# Patient Record
Sex: MALE | Race: WHITE | Employment: FULL TIME | URBAN - NONMETROPOLITAN AREA
[De-identification: names, ages, dates, MRNs, and addresses within clinical notes are randomized per-mention and may not be internally consistent; named-entity substitution may affect disease eponyms.]

---

## 2020-10-12 ENCOUNTER — HOSPITAL ENCOUNTER (OUTPATIENT)
Dept: GENERAL RADIOLOGY | Age: 55
Discharge: HOME | End: 2020-10-14
Payer: COMMERCIAL

## 2020-10-12 ENCOUNTER — HOSPITAL ENCOUNTER (OUTPATIENT)
Age: 55
Discharge: HOME | End: 2020-10-14
Payer: COMMERCIAL

## 2020-10-12 ENCOUNTER — OFFICE VISIT (OUTPATIENT)
Dept: CARDIOLOGY CLINIC | Age: 55
End: 2020-10-12
Payer: COMMERCIAL

## 2020-10-12 ENCOUNTER — HOSPITAL ENCOUNTER (OUTPATIENT)
Age: 55
Discharge: HOME | End: 2020-10-12
Payer: COMMERCIAL

## 2020-10-12 VITALS
DIASTOLIC BLOOD PRESSURE: 90 MMHG | HEART RATE: 46 BPM | OXYGEN SATURATION: 98 % | SYSTOLIC BLOOD PRESSURE: 140 MMHG | WEIGHT: 205.4 LBS

## 2020-10-12 PROCEDURE — 99204 OFFICE O/P NEW MOD 45 MIN: CPT | Performed by: INTERNAL MEDICINE

## 2020-10-12 PROCEDURE — 93005 ELECTROCARDIOGRAM TRACING: CPT

## 2020-10-12 PROCEDURE — 71046 X-RAY EXAM CHEST 2 VIEWS: CPT

## 2020-10-12 RX ORDER — LISINOPRIL 5 MG/1
5 TABLET ORAL 2 TIMES DAILY
Qty: 60 TABLET | Refills: 11 | Status: SHIPPED | OUTPATIENT
Start: 2020-10-12 | End: 2021-01-19

## 2020-10-12 RX ORDER — ASPIRIN 81 MG/1
162 TABLET ORAL DAILY
COMMUNITY
End: 2021-01-19

## 2020-10-12 RX ORDER — OMEPRAZOLE 20 MG/1
20 CAPSULE, DELAYED RELEASE ORAL
COMMUNITY

## 2020-10-12 RX ORDER — METOPROLOL TARTRATE 50 MG/1
25 TABLET, FILM COATED ORAL 2 TIMES DAILY
COMMUNITY
End: 2020-11-05

## 2020-10-12 NOTE — PROGRESS NOTES
Ov DR Jason Boggs  Ablation in 2017 at [address] one May 2019   Able to feel when in a fib.  20 years   3 children 1 son in [place]  2 daughters 1 going to   [employer]   Does not smoke   Does not drink. Tried drinking eat time  Went into a fib. Stopped heavy bicycling 4 years. Stopped eliquis on own. Father heart problems in 79  Pt has one brother one sister   No heart disease. Been on metoprolol since ablation   25 mg bid then 50 mg bid for 3 years. Not gone back to EP at [facility]. 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 Some sob walking up [place]   Bedside echo done. Snores but has never had  Sleep study. Will do echo at [facility] and FRIDA stress test here at [facility]. Encouraged to get Chao Chevy   And to restart eliquis. To decrease metoprolol to 25mg bid   A week prior to stress test.   Start lisinopril 5mg bid. Will give diltiazem 30mg prn. Will see in 3 mths no testing.

## 2020-10-12 NOTE — PROGRESS NOTES
Has always exercised. Used to be a cyclist and was in best shape of life. Elza Montes from [place] to [place]. A-flutter started 6 years ago, he had ablation. He had another ablation 4 years ago by Dr. Germaine Nair. Then 2 years ago he had another ablation at [facility]. In the last year has gone into A-fib. Can feel it happening. Wakes him up, gets SOB. 1 week ago he had episode that lasted for 12 hours before going away. Has family Hx. His mom and dad both had stenting. Used to take Eliquis but when was in sinus rhythm for a year he stopped it. He has doubled up on his Aspirin until came in for office for visit. 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

## 2020-10-12 NOTE — PATIENT INSTRUCTIONS
Decrease metoprolol 50mg to 25mg bid 1 week prior to stress test     Start lisinopril 5mg bid    Take diltiazem 30mg as needed for a fib.

## 2020-10-13 LAB
EKG ATRIAL RATE: 47 BPM
EKG P AXIS: 66 DEGREES
EKG P-R INTERVAL: 206 MS
EKG Q-T INTERVAL: 482 MS
EKG QRS DURATION: 104 MS
EKG QTC CALCULATION (BAZETT): 426 MS
EKG R AXIS: 75 DEGREES
EKG T AXIS: -160 DEGREES
EKG VENTRICULAR RATE: 47 BPM

## 2020-10-13 PROCEDURE — 93010 ELECTROCARDIOGRAM REPORT: CPT | Performed by: INTERNAL MEDICINE

## 2020-10-13 NOTE — PROGRESS NOTES
Starr Harrison M.D. [address] [place] [address]  [phone]        2020        Lori Fragoso DO  [address] [place] [address] [address]    RE:   Henny Nunez  :  1965    Dear Dr. Haroldo Jacobson:    CHIEF COMPLAINT:  Paroxysmal atrial fibrillation. HISTORY OF PRESENT ILLNESS:  I had the pleasure of seeing Mr. Miles Reyes in our office on 10/12/2020. 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 He has a history of paroxysmal atrial flutter. He was seen by Dr. Yeimi Carlin on 12/15/2016 for recurrent atrial flutter. At that time, he had an echocardiogram that showed apical LVH with overall normal LV function. He had had 4 cardioversions. Each time it happened, it was in atrial flutter. He had been seen by Dr. Luis Serrato, who is a cardiologist at [facility], prior to seeing Dr. Yeimi Carlin. His EKG at the time he saw Dr. Yeimi Carlin was markedly abnormal with chronically inverted T waves in V2 through V6 as well as inferior leads consistent with apical hypertrophy. He had a treadmill Myoview stress test on 2016 that was read as there was a small fixed defect in the basal, inferior septal, and inferior segments with mild hypokinesis of the mid inferior apical, anterior apical, and inferior segments. Ablation was done by Dr. Yeimi Carlin on 2017. He was continued on Eliquis. He was also placed on Lopressor 50 mg b.i.d. because of clinical reoccurrence of tachycardia and an event monitor showing atrial flutter as well as atrial fibrillation. His arrhythmias seemed to resolve several weeks after his ablation. He was seen again by Dr. Yeimi Carlin on 10/05/2017. He had missed a few doses of beta-blocker at that time and had more palpitations.   He did have an episode of atrial fibrillation with RVR and he was restarted on beta-blocker and his atrial fibrillation spontaneously converted. On 05/13/2019, he came to the emergency room in [facility] due to atrial flutter. He had stopped his Eliquis 8 months before. He had a repeat ablation on 05/13/2019 by Dr. Sergio Kumar, and Eliquis was restarted at that time. He was discharged on Eliquis. On 07/24/2020, he again developed atrial fibrillation. He went to the emergency room at [facility] and he initially was in atrial fibrillation. He had been in atrial fibrillation for 4 to 5 hours prior to going to the emergency room. He converted to sinus rhythm in the emergency room. Recommendation was to stay, but he declined to stay in the emergency room and left AMA. He continued on metoprolol 50 mg b.i.d. He had not been taking Eliquis. He then called our office and requested a consult and evaluation by myself. Mr. Calos Richardson has never had a myocardial infarction, never had a cardiac catheterization. There is a strong family history of coronary artery disease, however. 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 Following his ablation, he markedly cut back on his bicycling. He was somewhat hesitant to push himself hard after his ablation. He still bicycles occasionally recreationally, although it sounds like it is relatively infrequent. He had a stress test in 2016 and again showed a defect in his apex but otherwise was unremarkable. He has also a history of apical hypertrophy, although it has historically been nonobstructive. He has not had a stress test since 2016. He has noted decrease in energy level, although he is not sure whether this is due to aging or lack of exercise. He has occasional chest pain, but it is fairly atypical and not necessarily associated with activity. He has also noted some shortness of breath.   This seems to be somewhat positional such as when bending over and working on floor projects, etc.    He believes that he can Fausto Husbands in 05/2019. 3.  He has had borderline hypertension, and has used Lopressor both as an antihypertensive as well as for his atrial flutter. FAMILY HISTORY:  His father had a myocardial infarction at age 79. He has one brother and one sister, no heart disease. SOCIAL HISTORY:  He is 54years old. He is  to his second wife. He has 3 children, with one son 23years old by his first wife. His son lives with his ex-wife in [place]. He has 2 children from his second wife, with one daughter going to [place] in [employer] and another daughter, who is 15years old. Does not smoke, does not drink alcohol. He was in competitive bicycling and stopped bicycling after his first ablation. Works in [employer]. His wife was a . REVIEW OF SYSTEMS:  Cardiac as above. Other systems reviewed including constitutional, eyes, ears, nose and throat, cardiovascular, respiratory, GI, , musculoskeletal, integumentary, neurologic, endocrine, hematologic and allergic/immunologic are negative except for what is described above. PHYSICAL EXAMINATION:  VITAL SIGNS:  His blood pressure was 140/90 with a heart rate of 46 and regular. Respiratory rate 18. O2 sat 98%. Weight 205 pounds. GENERAL:  He is a pleasant 66-year-old gentleman. Denied pain. He was oriented to person, place and time. Answered questions appropriately. SKIN:  No unusual skin changes. HEENT:  The pupils are equally round and intact. Mucous membranes were dry. NECK:  No JVD. Good carotid pulses. No carotid bruits. No lymphadenopathy or thyromegaly. CARDIOVASCULAR EXAM:  S1 and S2 were normal.  No S3 or S4. Soft systolic blowing type murmur. No diastolic murmur. PMI was normal.  No lift, thrust, or pericardial friction rub. LUNGS:  Quite clear to auscultation and percussion. ABDOMEN:  Soft and nontender. Good bowel sounds. The aorta was not enlarged. No hepatomegaly, splenomegaly. EXTREMITIES:  Good femoral pulses.   Good pedal pulses. No pedal edema. Skin was warm and dry. No calf tenderness. Nail beds pink. Good cap refill. PULSES:  Bilateral symmetrical radial, brachial and carotid pulses. No carotid bruits. Good femoral and pedal pulses. NEUROLOGIC EXAM:  Within normal limits. PSYCHIATRIC EXAM:  Within normal limits. LABORATORY DATA:  From 08/17/2020, his cholesterol 189, HDL was 47, , triglycerides 182. Glucose 93, BUN 22, creatinine 1.16, GFR was 71, sodium 140, potassium 5.1, calcium was 9.7. AST was 19, ALT was 23. Magnesium 2.2.  TSH was 0.99. White count 9.6, hemoglobin 15.9 with a platelet count of 082,538. PSA was 0.7. EKG showed sinus rhythm with marked ST-T changes anterolaterally with T-wave inversion in I, II, III and aVF as well as V2 through V6. However, in looking at his old EKG, it looks essentially unchanged. Bedside echocardiogram showed normal LV function. His right-sided chambers seemed generous, most consistent with mild pulmonary hypertension from sleep apnea. Chest x-ray was unremarkable. IMPRESSION:  1. History of atrial flutter ablation by Dr. Lisandra Contreras on 01/16/2017.  2.  Recurrent atrial flutter, with atrial flutter ablation by Dr. Rosemarie Driscoll on 05/13/2019 at [facility]. 3.  Recurrent atrial fibrillation following this, with an ER visit in [facility] on 07/24/2020, after 12 hours of atrial fibrillation, which converted spontaneously in the emergency room at [facility], with [facility]. 4.  Currently not anticoagulated with Eliquis but is taking baby aspirin 2 tablets daily. 5.  Hypertension. 6.  Family history of coronary artery disease. 7.  Markedly abnormal EKG with T-wave inversion inferior, anterolaterally although his EKG is unchanged from 2016.  8.  History of apical hypertrophy, nonobstructive, with normal LV function. 9.  Mildly abnormal Myoview stress test on 12/21/2016, with defect apically, which was fixed and nonreversible.   10.  Atypical chest and left arm discomfort with also slightly decreased energy level and shortness of breath with exertion, which all could be an anginal equivalent. 11.  Bradycardia, which has been present historically because of a competitive cyclist, although at this time it is most likely due to his Lopressor. 12.  Possible sleep apnea, with a large snoring history. PLAN:  1. Decrease Lopressor to 25 mg b.i.d.  2.  Repeat echocardiogram at [facility] in [place]. 3.  Treadmill Myoview stress test because of his shortness of breath, chest pain and abnormal EKG. 4.  Start Vasotec 5 mg b.i.d. for hypertension, especially since Lopressor will be decreased. 5.  If his stress test is unremarkable or normal, I will switch from beta-blocker to a calcium-channel blocker with Cardizem  mg daily to see if this will control his atrial fibrillation. 6.  Restart Eliquis at 5 mg b.i.d.  7.  If he is not controlled with beta-blockade, would then consider sending for ablation again as he does not wish to be treated medically with amiodarone or flecainide, etc.  8.  We will get a [employer] device to monitor his rhythm at home. DISCUSSION: Mr. Zoltan Calloway has a history of atrial flutter with ablation on 01/16/2017 by Dr. Sumanth Dale. He has recurrent atrial flutter and had repeat ablation at [facility] by Dr. Temo Mendoza in 05/2019. However, he has had recurrence of his atrial fibrillation since that time. I would recommend restarting his Eliquis at 5 mg b.i.d. for protection since he is having recurrent atrial fibrillation. His metoprolol 50 mg b.i.d. is not controlling his fibrillation. He has not been on any calcium-channel blocker and it would be reasonable to switch over to a calcium-channel blocker to see if this would help control his atrial fibrillation. He is bradycardic with a heart rate in the high 40s and low 50s, and therefore, before starting the calcium-channel blocker, I would wean off from the beta-blocker.     He has a long history of a markedly abnormal EKG, mostly from his apical hypertrophy. Because of his risk factors of coronary artery disease, including a family history, I will do a treadmill Myoview stress test.  He has been somewhat afraid to do heavy physical activity, such as biking on a regular basis, and therefore, we will try to push him hard on the treadmill to make sure that he has no arrhythmias with heavy activity. If his Myoview stress test is normal, then I would not do a cardiac catheterization. I would then feel more comfortable weaning him off of his beta-blocker and starting Cardizem initially at 120 mg daily. We may need to increase this to attempt to control his atrial fibrillation. He is hypertensive and therefore, with lowering of the beta-blocker from 50 mg to 25 mg b.i.d., we will institute lisinopril at 5 mg b.i.d. This may need to be increased. We will repeat an echocardiogram to look at his hypertrophy. We will also get an idea of his right-sided chamber size with his repeat echocardiogram.  On the bedside echocardiogram, he appeared to have some enlargement of the right-sided chambers. He does have a snoring history and this combined with his mildly dilated right-sided chambers would indicate that he needs a sleep study, which he is agreeable to do. We will order this after we do the stress test.    Again, he does not wish to be on \"heavy-duty medication. \"  He would rather do ablation if necessary. Therefore, if he is not controlled with Cardizem, then I would send him for repeat ablation for his atrial fibrillation. I have recommended that he get a Kardia device to monitor his rhythm at home. If he would feel his atrial fibrillation, he will be able to record on his Kardia device and save to his cell phone for us to review. He does complain of some shortness of breath especially when bending over.   I do not think this is cardiac but more likely to be compression of his lung by his diaphragm causing shortness of breath when bending over. Thank you very much for allowing me the privilege of seeing Mr. Enrique Davis. If you have any questions on my thoughts, please do not hesitate to contact me.      Sincerely,        Khushi Trejo    D: 10/13/2020 4:42:24     T: 10/13/2020 4:56:28     [id number]  Job#: [id number]   Doc#: [id number]

## 2020-10-29 ENCOUNTER — HOSPITAL ENCOUNTER (EMERGENCY)
Age: 55
Discharge: ANOTHER HOSPITAL | End: 2020-10-29
Attending: EMERGENCY MEDICINE
Payer: COMMERCIAL

## 2020-10-29 ENCOUNTER — HOSPITAL ENCOUNTER (OUTPATIENT)
Dept: NON INVASIVE DIAGNOSTICS | Age: 55
Discharge: HOME | End: 2020-10-29
Payer: COMMERCIAL

## 2020-10-29 ENCOUNTER — HOSPITAL ENCOUNTER (OUTPATIENT)
Dept: NUCLEAR MEDICINE | Age: 55
Discharge: HOME | End: 2020-10-31
Payer: COMMERCIAL

## 2020-10-29 VITALS
WEIGHT: 206 LBS | OXYGEN SATURATION: 100 % | HEIGHT: 71 IN | BODY MASS INDEX: 28.84 KG/M2 | HEART RATE: 56 BPM | SYSTOLIC BLOOD PRESSURE: 152 MMHG | DIASTOLIC BLOOD PRESSURE: 102 MMHG | RESPIRATION RATE: 15 BRPM | TEMPERATURE: 96.8 F

## 2020-10-29 LAB
ALBUMIN SERPL-MCNC: 4.7 G/DL (ref 3.5–5.2)
ALBUMIN/GLOBULIN RATIO: ABNORMAL (ref 1–2.5)
ALP BLD-CCNC: 91 U/L (ref 40–129)
ALT SERPL-CCNC: 25 U/L (ref 5–41)
ANION GAP SERPL CALCULATED.3IONS-SCNC: 12 MMOL/L (ref 9–17)
AST SERPL-CCNC: 20 U/L
BILIRUB SERPL-MCNC: 0.45 MG/DL (ref 0.3–1.2)
BUN BLDV-MCNC: 24 MG/DL (ref 6–20)
BUN/CREAT BLD: 20 (ref 9–20)
CALCIUM SERPL-MCNC: 9.9 MG/DL (ref 8.6–10.4)
CHLORIDE BLD-SCNC: 104 MMOL/L (ref 98–107)
CO2: 25 MMOL/L (ref 20–31)
CREAT SERPL-MCNC: 1.18 MG/DL (ref 0.7–1.2)
GFR AFRICAN AMERICAN: >60 ML/MIN
GFR NON-AFRICAN AMERICAN: >60 ML/MIN
GFR SERPL CREATININE-BSD FRML MDRD: ABNORMAL ML/MIN/{1.73_M2}
GFR SERPL CREATININE-BSD FRML MDRD: ABNORMAL ML/MIN/{1.73_M2}
GLUCOSE BLD-MCNC: 130 MG/DL (ref 70–99)
HCT VFR BLD CALC: 47 % (ref 41–53)
HEMOGLOBIN: 15.6 G/DL (ref 13.5–17.5)
INR BLD: 1
MCH RBC QN AUTO: 29.1 PG (ref 26–34)
MCHC RBC AUTO-ENTMCNC: 33.2 G/DL (ref 31–37)
MCV RBC AUTO: 87.8 FL (ref 80–100)
NRBC AUTOMATED: ABNORMAL PER 100 WBC
PARTIAL THROMBOPLASTIN TIME: 34.3 SEC (ref 23.9–33.8)
PDW BLD-RTO: 12.8 % (ref 12.1–15.2)
PLATELET # BLD: 288 K/UL (ref 140–450)
PMV BLD AUTO: ABNORMAL FL (ref 6–12)
POTASSIUM SERPL-SCNC: 4.4 MMOL/L (ref 3.7–5.3)
PROTHROMBIN TIME: 13 SEC (ref 11.5–14.2)
RBC # BLD: 5.36 M/UL (ref 4.5–5.9)
SARS-COV-2, RAPID: NOT DETECTED
SARS-COV-2: NORMAL
SARS-COV-2: NORMAL
SODIUM BLD-SCNC: 141 MMOL/L (ref 135–144)
SOURCE: NORMAL
TOTAL PROTEIN: 7.7 G/DL (ref 6.4–8.3)
TROPONIN INTERP: NORMAL
TROPONIN T: <0.03 NG/ML
TROPONIN, HIGH SENSITIVITY: NORMAL NG/L (ref 0–22)
WBC # BLD: 11.6 K/UL (ref 3.5–11)

## 2020-10-29 PROCEDURE — 93017 CV STRESS TEST TRACING ONLY: CPT

## 2020-10-29 PROCEDURE — 6360000002 HC RX W HCPCS: Performed by: FAMILY MEDICINE

## 2020-10-29 PROCEDURE — 80053 COMPREHEN METABOLIC PANEL: CPT

## 2020-10-29 PROCEDURE — 78452 HT MUSCLE IMAGE SPECT MULT: CPT

## 2020-10-29 PROCEDURE — 2500000003 HC RX 250 WO HCPCS: Performed by: EMERGENCY MEDICINE

## 2020-10-29 PROCEDURE — 2580000003 HC RX 258: Performed by: EMERGENCY MEDICINE

## 2020-10-29 PROCEDURE — 6360000002 HC RX W HCPCS: Performed by: INTERNAL MEDICINE

## 2020-10-29 PROCEDURE — 85730 THROMBOPLASTIN TIME PARTIAL: CPT

## 2020-10-29 PROCEDURE — C9803 HOPD COVID-19 SPEC COLLECT: HCPCS

## 2020-10-29 PROCEDURE — U0002 COVID-19 LAB TEST NON-CDC: HCPCS

## 2020-10-29 PROCEDURE — 6370000000 HC RX 637 (ALT 250 FOR IP)

## 2020-10-29 PROCEDURE — A9500 TC99M SESTAMIBI: HCPCS | Performed by: INTERNAL MEDICINE

## 2020-10-29 PROCEDURE — 85610 PROTHROMBIN TIME: CPT

## 2020-10-29 PROCEDURE — 96365 THER/PROPH/DIAG IV INF INIT: CPT

## 2020-10-29 PROCEDURE — 6370000000 HC RX 637 (ALT 250 FOR IP): Performed by: INTERNAL MEDICINE

## 2020-10-29 PROCEDURE — 3430000000 HC RX DIAGNOSTIC RADIOPHARMACEUTICAL: Performed by: INTERNAL MEDICINE

## 2020-10-29 PROCEDURE — 6360000002 HC RX W HCPCS

## 2020-10-29 PROCEDURE — 96374 THER/PROPH/DIAG INJ IV PUSH: CPT

## 2020-10-29 PROCEDURE — 99285 EMERGENCY DEPT VISIT HI MDM: CPT

## 2020-10-29 PROCEDURE — 93005 ELECTROCARDIOGRAM TRACING: CPT | Performed by: EMERGENCY MEDICINE

## 2020-10-29 PROCEDURE — 85027 COMPLETE CBC AUTOMATED: CPT

## 2020-10-29 PROCEDURE — 6360000002 HC RX W HCPCS: Performed by: EMERGENCY MEDICINE

## 2020-10-29 PROCEDURE — 2500000003 HC RX 250 WO HCPCS: Performed by: INTERNAL MEDICINE

## 2020-10-29 PROCEDURE — 96375 TX/PRO/DX INJ NEW DRUG ADDON: CPT

## 2020-10-29 PROCEDURE — 84484 ASSAY OF TROPONIN QUANT: CPT

## 2020-10-29 RX ORDER — ASPIRIN 81 MG/1
243 TABLET, CHEWABLE ORAL ONCE
Status: COMPLETED | OUTPATIENT
Start: 2020-10-29 | End: 2020-10-29

## 2020-10-29 RX ORDER — MORPHINE SULFATE 4 MG/ML
4 INJECTION, SOLUTION INTRAMUSCULAR; INTRAVENOUS ONCE
Status: COMPLETED | OUTPATIENT
Start: 2020-10-29 | End: 2020-10-29

## 2020-10-29 RX ORDER — HEPARIN SODIUM 1000 [USP'U]/ML
5000 INJECTION, SOLUTION INTRAVENOUS; SUBCUTANEOUS ONCE
Status: COMPLETED | OUTPATIENT
Start: 2020-10-29 | End: 2020-10-29

## 2020-10-29 RX ORDER — SODIUM CHLORIDE 9 MG/ML
INJECTION, SOLUTION INTRAVENOUS ONCE
Status: COMPLETED | OUTPATIENT
Start: 2020-10-29 | End: 2020-10-29

## 2020-10-29 RX ORDER — ASPIRIN 81 MG/1
TABLET, CHEWABLE ORAL
Status: COMPLETED
Start: 2020-10-29 | End: 2020-10-29

## 2020-10-29 RX ORDER — FENTANYL CITRATE 50 UG/ML
50 INJECTION, SOLUTION INTRAMUSCULAR; INTRAVENOUS ONCE
Status: COMPLETED | OUTPATIENT
Start: 2020-10-29 | End: 2020-10-29

## 2020-10-29 RX ORDER — MORPHINE SULFATE 4 MG/ML
INJECTION, SOLUTION INTRAMUSCULAR; INTRAVENOUS
Status: COMPLETED
Start: 2020-10-29 | End: 2020-10-29

## 2020-10-29 RX ORDER — FENTANYL CITRATE 50 UG/ML
100 INJECTION, SOLUTION INTRAMUSCULAR; INTRAVENOUS ONCE
Status: COMPLETED | OUTPATIENT
Start: 2020-10-29 | End: 2020-10-29

## 2020-10-29 RX ORDER — FENTANYL CITRATE 50 UG/ML
INJECTION, SOLUTION INTRAMUSCULAR; INTRAVENOUS
Status: DISCONTINUED
Start: 2020-10-29 | End: 2020-10-29 | Stop reason: HOSPADM

## 2020-10-29 RX ORDER — METOPROLOL TARTRATE 5 MG/5ML
5 INJECTION INTRAVENOUS EVERY 6 HOURS
Status: DISCONTINUED | OUTPATIENT
Start: 2020-10-29 | End: 2020-10-29 | Stop reason: HOSPADM

## 2020-10-29 RX ORDER — NITROGLYCERIN 0.4 MG/1
0.4 TABLET SUBLINGUAL EVERY 5 MIN PRN
Status: DISCONTINUED | OUTPATIENT
Start: 2020-10-29 | End: 2020-10-29 | Stop reason: HOSPADM

## 2020-10-29 RX ORDER — NITROGLYCERIN 20 MG/100ML
10 INJECTION INTRAVENOUS CONTINUOUS
Status: DISCONTINUED | OUTPATIENT
Start: 2020-10-29 | End: 2020-10-29 | Stop reason: HOSPADM

## 2020-10-29 RX ADMIN — FENTANYL CITRATE 100 MCG: 50 INJECTION, SOLUTION INTRAMUSCULAR; INTRAVENOUS at 10:37

## 2020-10-29 RX ADMIN — NITROGLYCERIN 0.4 MG: 0.4 TABLET, ORALLY DISINTEGRATING SUBLINGUAL at 11:03

## 2020-10-29 RX ADMIN — SODIUM CHLORIDE: 9 INJECTION, SOLUTION INTRAVENOUS at 10:48

## 2020-10-29 RX ADMIN — ASPIRIN 243 MG: 81 TABLET, CHEWABLE ORAL at 10:30

## 2020-10-29 RX ADMIN — NITROGLYCERIN 0.4 MG: 0.4 TABLET, ORALLY DISINTEGRATING SUBLINGUAL at 10:22

## 2020-10-29 RX ADMIN — MORPHINE SULFATE 4 MG: 4 INJECTION, SOLUTION INTRAMUSCULAR; INTRAVENOUS at 10:30

## 2020-10-29 RX ADMIN — FENTANYL CITRATE 50 MCG: 50 INJECTION, SOLUTION INTRAMUSCULAR; INTRAVENOUS at 10:56

## 2020-10-29 RX ADMIN — NITROGLYCERIN 0.4 MG: 0.4 TABLET, ORALLY DISINTEGRATING SUBLINGUAL at 10:19

## 2020-10-29 RX ADMIN — NITROGLYCERIN 10 MCG/MIN: 20 INJECTION INTRAVENOUS at 10:40

## 2020-10-29 RX ADMIN — TETRAKIS(2-METHOXYISOBUTYLISOCYANIDE)COPPER(I) TETRAFLUOROBORATE 30 MILLICURIE: 1 INJECTION, POWDER, LYOPHILIZED, FOR SOLUTION INTRAVENOUS at 09:55

## 2020-10-29 RX ADMIN — HEPARIN SODIUM 5000 UNITS: 1000 INJECTION, SOLUTION INTRAVENOUS; SUBCUTANEOUS at 10:50

## 2020-10-29 RX ADMIN — TETRAKIS(2-METHOXYISOBUTYLISOCYANIDE)COPPER(I) TETRAFLUOROBORATE 10 MILLICURIE: 1 INJECTION, POWDER, LYOPHILIZED, FOR SOLUTION INTRAVENOUS at 09:54

## 2020-10-29 RX ADMIN — METOROPROLOL TARTRATE 5 MG: 5 INJECTION, SOLUTION INTRAVENOUS at 10:21

## 2020-10-29 RX ADMIN — Medication 243 MG: at 10:30

## 2020-10-29 RX ADMIN — TICAGRELOR 180 MG: 90 TABLET ORAL at 10:49

## 2020-10-29 ASSESSMENT — PAIN DESCRIPTION - PAIN TYPE
TYPE: ACUTE PAIN
TYPE: ACUTE PAIN

## 2020-10-29 ASSESSMENT — PAIN SCALES - GENERAL
PAINLEVEL_OUTOF10: 10
PAINLEVEL_OUTOF10: 7
PAINLEVEL_OUTOF10: 7
PAINLEVEL_OUTOF10: 10
PAINLEVEL_OUTOF10: 5
PAINLEVEL_OUTOF10: 10

## 2020-10-29 ASSESSMENT — PAIN DESCRIPTION - DESCRIPTORS: DESCRIPTORS: TIGHTNESS

## 2020-10-29 ASSESSMENT — PAIN DESCRIPTION - LOCATION
LOCATION: CHEST
LOCATION: CHEST

## 2020-10-29 NOTE — PROCEDURES
[place] [address]                              CARDIAC STRESS TEST    PATIENT NAME: Marchelle Dandy                       :        1965  MED REC NO:   [id number]                              ROOM:  ACCOUNT NO:   [de-identified]                           ADMIT DATE: 10/29/2020  PROVIDER:     Edward Carney      DATE OF STUDY:  10/29/2020    Cardiovascular Diagnostics Department    Ordering Provider:  Nichelle Whipple MD    Primary Care Provider:  Bladimir Atkinson DO    Interpreting Physician:   Edward Moffett. MD Eric    MYOCARDIAL PERFUSION STRESS IMAGING    Resting images only. There is anterior wall, inferior and inferolateral wall defect. In a patient with no prior history of myocardial infarction this is  abnormal perfusion study. RUBEN CARNEY    D: 10/29/2020 12:17:42       T: 10/29/2020 12:21:42     RAPHAEL_MICAH  Job#: [id number]     Doc#: Unknown    CC:  Aline Trinh

## 2020-10-29 NOTE — PROGRESS NOTES
Patient hooked up for stress test and patient started to complain about arm discomfort and not feeling right. EKG changes noted and Tech went and grabbed Dr. Maricarmen Ochoa. RN Supervisor called. Patient transferred to ER.

## 2020-10-29 NOTE — ED PROVIDER NOTES
[address] COMPLAINT    Chief Complaint   Patient presents with    Chest Pain     Pt was in stress lab for stress test and having severe chest pain       HPI    Willie Nassar is a 54 y.o. male who presentsto ED from Stress lab. By stretcher. With complaint of chest pain. Onset PTA during stress test.  Intensity of symptoms severe. Location of symptoms anterior chest.  Patient has history of A. fib. Patient is on Eliquis. Patient was doing a routine stress test during which she developed chest pain left shoulder pain. Patient had runs of nonsustained V. tach. Patient was brought to ED with ST elevation in V23 and 4. Patient had severe chest pain. 10 out of 10. Dr Neftali Connor is at bedside with the patient. PAST MEDICAL HISTORY    Past Medical History:   Diagnosis Date    Atrial fibrillation ([address]     Chronic GERD     Hypertension        SURGICAL HISTORY    Past Surgical History:   Procedure Laterality Date    ABLATION OF DYSRHYTHMIC FOCUS      CLAVICLE SURGERY         CURRENT MEDICATIONS    Current Outpatient Rx   Medication Sig Dispense Refill    metoprolol tartrate (LOPRESSOR) 50 MG tablet Take 25 mg by mouth 2 times daily       omeprazole (PRILOSEC) 20 MG delayed release capsule Take 20 mg by mouth three times a week      aspirin 81 MG EC tablet Take 162 mg by mouth daily      Magnesium 100 MG TABS Take by mouth      apixaban (ELIQUIS) 5 MG TABS tablet Take 1 tablet by mouth 2 times daily 60 tablet 11    lisinopril (PRINIVIL;ZESTRIL) 5 MG tablet Take 1 tablet by mouth 2 times daily 60 tablet 11    dilTIAZem (CARDIZEM) 30 MG tablet Take 1 tablet by mouth as needed (a fib) 30 tablet 3       ALLERGIES    No Known Allergies    FAMILY HISTORY    No family history on file.     SOCIAL HISTORY    Social History     Socioeconomic History    Marital status:      Spouse name: Not on file    Number of children: Not on file    Years of education: Not on file    Highest education level: Not on file   Occupational History    Not on file   Social Needs    Financial resource strain: Not on file    Food insecurity     Worry: Not on file     Inability: Not on file    Transportation needs     Medical: Not on file     Non-medical: Not on file   Tobacco Use    Smoking status: Never Smoker    Smokeless tobacco: Never Used   Substance and Sexual Activity    Alcohol use: Not Currently    Drug use: Not on file    Sexual activity: Not on file   Lifestyle    Physical activity     Days per week: Not on file     Minutes per session: Not on file    Stress: Not on file   Relationships    Social connections     Talks on phone: Not on file     Gets together: Not on file     Attends Religion service: Not on file     Active member of club or organization: Not on file     Attends meetings of clubs or organizations: Not on file     Relationship status: Not on file    Intimate partner violence     Fear of current or ex partner: Not on file     Emotionally abused: Not on file     Physically abused: Not on file     Forced sexual activity: Not on file   Other Topics Concern    Not on file   Social History Narrative    Not on file           Review of Systems:  Constitutional:  Denies fever, chills, weight loss or weakness   Eyes:  Denies photophobia or discharge   HENT:  Denies sore throat or ear pain   Respiratory:  Denies cough or shortness of breath   Cardiovascular: Positive for chest pain  GI:  Denies abdominal pain, nausea, vomiting, or diarrhea   Musculoskeletal:  Denies back pain   Skin:  Denies rash   Neurologic:  Denies headache, focal weakness or sensory changes   Endocrine:  Denies polyuria or polydypsia   Lymphatic:  Denies swollen glands   Psychiatric:  Denies depression, suicidal ideation or homicidal ideation   All systems negative except as marked.      PHYSICAL EXAM    VITAL SIGNS: BP (!) 152/102   Pulse 56   Temp 96.8 °F (36 °C) (Temporal)   Resp 15   Ht 5' 11\" (1.803 m)   Wt 206 lb (93.4 kg)   SpO2 100%   BMI 28.73 kg/m²    Constitutional: Ill-appearing [facility] with severe chest [facility]  Normocephalic, Atraumatic, Bilateral external ears normal, Oropharynx moist, No oral exudates, Nose normal. Neck- Normal range of motion, No tenderness, Supple, No stridor. Eyes:  PERRL, EOMI, Conjunctiva normal, No discharge. Respiratory:  Normal breath sounds, No respiratory distress, No wheezing, No chest tenderness. Cardiovascular:  Normal heart rate, Normal rhythm, No murmurs, No rubs, No gallops. GI:  Bowel sounds normal, Soft, No tenderness, No masses, No pulsatile masses. : External genitalia appear normal, No masses or lesions. No discharge. No CVA tenderness. Musculoskeletal:  Intact distal pulses, No edema, No tenderness, No cyanosis, No clubbing. Good range of motion in all major joints. No tenderness to palpation or major deformities noted. Back- No tenderness. Integument:  Warm, Dry, No erythema, No rash. Lymphatic:  No lymphadenopathy noted. Neurologic:  Alert & oriented x 3, Normal motor function, Normal sensory function, No focal deficits noted. Psychiatric:  Affect normal, Judgment normal, Mood normal.     EKG    ST elevation in V2,3, and 4    RADIOLOGY    No orders to display       [id number] [employer] Reviewed   CBC - Abnormal; Notable for the following components:       Result Value    WBC 11.6 (*)     All other components within normal limits   COMPREHENSIVE METABOLIC PANEL - Abnormal; Notable for the following components:    Glucose 130 (*)     BUN 24 (*)     All other components within normal limits   APTT - Abnormal; Notable for the following components:    PTT 34.3 (*)     All other components within normal limits   COVID-19   TROPONIN   PROTIME-INR             Summation  Critical care 30 minutes    Patient Course: Patient was treated per STEMI protocol. Patient is on Eliquis.   Patient was given heparin bolus. Patient is given Brilinta 180 mg p.o. Patient is started on nitroglycerin drip. Patient was given multiple boluses of Fentanyl  [facility] was called to transport the patient. I discussed the patient with Samantha Gonzales. Patient is accepted for transfer. Prior to transfer patient has stable vital signs. Patient's pain is 5-6/10. ED Medications administered this visit:    Medications   nitroGLYCERIN 50 mg in dextrose 5% 250 mL infusion (0 mcg/min Intravenous Patient Transferred to Other Facility 10/29/20 1123)   nitroGLYCERIN (NITROSTAT) SL tablet 0.4 mg (0.4 mg Sublingual Given 10/29/20 1103)   metoprolol (LOPRESSOR) injection 5 mg (5 mg Intravenous Given 10/29/20 1021)   fentaNYL (SUBLIMAZE) injection 100 mcg (100 mcg Intravenous Given 10/29/20 1037)   morphine sulfate (PF) injection 4 mg (4 mg Intravenous Given 10/29/20 1030)   aspirin chewable tablet 243 mg (243 mg Oral Given 10/29/20 1030)   0.9 % sodium chloride infusion ( Intravenous Patient Transferred to Other Facility 10/29/20 1120)   ticagrelor (BRILINTA) tablet 180 mg (180 mg Oral Given 10/29/20 1049)   heparin (porcine) injection 5,000 Units (5,000 Units Intravenous Given 10/29/20 1050)   fentaNYL (SUBLIMAZE) injection 50 mcg (50 mcg Intravenous Given 10/29/20 1056)       New Prescriptions from this visit:    Discharge Medication List as of 10/29/2020 11:27 AM          Follow-up:  No follow-up provider specified. Final Impression:   1.  ST elevation myocardial infarction involving left main coronary artery ([facility] [address]               (Please note that portions of this note were completed with a voice recognition program.  Efforts were made to edit the dictations but occasionally words are mis-transcribed.)       Silvia Wen MD  10/29/20 0839

## 2020-10-29 NOTE — ED NOTES
This nurse called to stress lab by nursing supervisor. This nurse arrives to stress lab, Dr. Sana Zhao RN, Evert Heath RT. Pt was here for outpatient stress test.  Pt was hooked up and started having severe chest pain. Pt EKG read STEMI. Pt brought to ED with this nurse, Dr. Robert Gonzales RT and [facility] RN.       Dawit Juares, CALI  10/29/20 8193

## 2020-10-30 LAB
EKG ATRIAL RATE: 63 BPM
EKG ATRIAL RATE: 73 BPM
EKG P AXIS: 56 DEGREES
EKG P AXIS: 59 DEGREES
EKG P-R INTERVAL: 214 MS
EKG P-R INTERVAL: 214 MS
EKG Q-T INTERVAL: 442 MS
EKG Q-T INTERVAL: 466 MS
EKG QRS DURATION: 90 MS
EKG QRS DURATION: 96 MS
EKG QTC CALCULATION (BAZETT): 476 MS
EKG QTC CALCULATION (BAZETT): 486 MS
EKG R AXIS: 44 DEGREES
EKG R AXIS: 53 DEGREES
EKG T AXIS: -124 DEGREES
EKG T AXIS: -146 DEGREES
EKG VENTRICULAR RATE: 63 BPM
EKG VENTRICULAR RATE: 73 BPM

## 2020-10-30 PROCEDURE — 93010 ELECTROCARDIOGRAM REPORT: CPT | Performed by: INTERNAL MEDICINE

## 2020-11-02 ENCOUNTER — TELEPHONE (OUTPATIENT)
Dept: CARDIOLOGY CLINIC | Age: 55
End: 2020-11-02

## 2020-11-02 ENCOUNTER — HOSPITAL ENCOUNTER (OUTPATIENT)
Dept: NON INVASIVE DIAGNOSTICS | Age: 55
Discharge: HOME | End: 2020-11-02
Payer: COMMERCIAL

## 2020-11-02 PROCEDURE — 93005 ELECTROCARDIOGRAM TRACING: CPT | Performed by: INTERNAL MEDICINE

## 2020-11-02 RX ORDER — AMIODARONE HYDROCHLORIDE 200 MG/1
200 TABLET ORAL 2 TIMES DAILY
Qty: 60 TABLET | Refills: 11 | Status: SHIPPED | OUTPATIENT
Start: 2020-11-02 | End: 2021-01-19 | Stop reason: ALTCHOICE

## 2020-11-02 NOTE — TELEPHONE ENCOUNTER
Pt arrived for cardioversion   ekg done in sinus rhythm   Will give amiodarone 200mg bid  See pt in November will set up for  Cath on November 18

## 2020-11-02 NOTE — PROGRESS NOTES
Pt had heart cath done   Discharge from [facility] yesterday   Now in a ib   Will bring pt up for cardioversion today

## 2020-11-02 NOTE — PROCEDURES
[place] [address]                              CARDIAC STRESS TEST    PATIENT NAME: Eleanor Caraballo                       :        1965  MED REC NO:   [id number]                              ROOM:  ACCOUNT NO:   [de-identified]                           ADMIT DATE: 10/29/2020  PROVIDER:     Coleman King      DATE OF STUDY:  10/29/2020    NAME OF TEST:  Aborted treadmill Myoview stress test because of chest  pain and acute ST elevation in V2, V3. We had scheduled a treadmill Myoview stress test on the patient. We  gave him the resting Myoview infusion. He was in our CT room waiting to  start his treadmill stress test.  However, before he started the  treadmill, he began developing short runs of nonsustained ventricular  tachycardia up to 8 beats at 180 beats per minute. He then began  developing chest pressure and chest tightness. His EKG changed and he  started developing ST elevation in V2 and V3 and slightly in V4  consistent with an acute septal myocardial infarction. We gave him 5 mg  of IV Lopressor in the stress test room along with sublingual  nitroglycerin. His pain continued and we moved him immediately to the  emergency room. EKG confirmed that he was having an acute anterior  septal myocardial infarction. Hemodynamically, he remained stable. He  was given nitroglycerin sublingually and also he was placed on a  nitroglycerin infusion in the emergency room at 15 mcg per minute. He  was also given 180 mg of Brilinta orally. His pain began to subside and  he was transferred acutely to [facility] in [address] for emergent  cardiac catheterization. Obviously, he did not do the treadmill portion of the stress test nor  the stress Myoview images.     Awaiting resting Myoview portion of the stress test.      Ermalene Base    D: 2020 18:52:17       T: 2020 18:55:24     [id number]  Job#: [id number]     Doc#: [id number]    CC:

## 2020-11-03 ENCOUNTER — TELEPHONE (OUTPATIENT)
Dept: CARDIOLOGY CLINIC | Age: 55
End: 2020-11-03

## 2020-11-03 LAB
EKG ATRIAL RATE: 76 BPM
EKG P AXIS: 76 DEGREES
EKG P-R INTERVAL: 184 MS
EKG Q-T INTERVAL: 400 MS
EKG QRS DURATION: 94 MS
EKG QTC CALCULATION (BAZETT): 450 MS
EKG R AXIS: 77 DEGREES
EKG T AXIS: 84 DEGREES
EKG VENTRICULAR RATE: 76 BPM

## 2020-11-03 PROCEDURE — 93010 ELECTROCARDIOGRAM REPORT: CPT | Performed by: INTERNAL MEDICINE
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
## 2020-11-05 ENCOUNTER — TELEPHONE (OUTPATIENT)
Dept: CARDIOLOGY CLINIC | Age: 55
End: 2020-11-05

## 2020-11-05 RX ORDER — METOPROLOL TARTRATE 50 MG/1
50 TABLET, FILM COATED ORAL 3 TIMES DAILY
COMMUNITY
End: 2020-12-22 | Stop reason: SDUPTHER

## 2020-11-05 NOTE — TELEPHONE ENCOUNTER
Patient called with some questions for Dr Valdemar Fang. 1) Based on location of dissection is there a physical position for healing he should be doing or a position he should NOT lay in? 2) Also since he has to do another MRI and Cath to see if dissection healed should he continue to work? His opinion is he does not feel he should continue to work at this time. 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 This in itself is causing induced stress. He has been taking the Lisinopril. His BP has been running 120's-130's/80's and his HR has been running in the 70's and 80's.       Please call and adivse

## 2020-11-05 NOTE — TELEPHONE ENCOUNTER
DR Zack Pérez talked with pt   Increase metoprolol 50mg to tid   Pt to be off work until after cath is done.

## 2020-11-09 ENCOUNTER — TELEPHONE (OUTPATIENT)
Dept: CARDIOLOGY | Age: 55
End: 2020-11-09

## 2020-11-09 NOTE — TELEPHONE ENCOUNTER
Pamela Light M.D. [address] [place] [address]  [phone]        2020        RE:   Vicky Hoang  :  1965    Canceled cardioversion. Mr. Peter Michele called me this morning and stated that he had been in atrial fib/flutter since yesterday when he left the hospital in [place]. I had him take Cardizem 30 mg orally at home. He had been on Xarelto, and therefore, we brought him up here today to do a cardioversion. When he arrived, he was in sinus rhythm, and therefore, we did not do the cardioversion. I have added amiodarone 200 mg b.i.d. to keep him in sinus rhythm. He will be seeing EP later to discuss ablation. He had been hospitalized with an anteroseptal myocardial infarction on 10/29. This happened while he was waiting to do a stress test.    He has a history of apical hypertrophy, and therefore, Dr. Laura Hartmann had recommended doing a cardiac MRI. There was also a recommendation to repeat a cardiac catheterization in 6 weeks to re-look at a dissected septal . We have scheduled his cardiac catheterization for . He wished to have me do it. I have called Dr. Laura Hartmann concerning the cardiac MRI and she will put the order in, and they will call him on the date of the cardiac MRI. He will take amiodarone 200 mg b.i.d. until I see him on approximately , in preparation for his cardiac catheterization at that time. We will decrease his amiodarone to 200 mg daily at that time. Thank you very much.     Sincerely,        Billie Velazquez    D: 2020 11:14:37     T: 2020 11:18:26     [email or web address]  Job#: [id number]   Doc#: [id number]

## 2020-11-16 ENCOUNTER — TELEPHONE (OUTPATIENT)
Dept: CARDIOLOGY CLINIC | Age: 55
End: 2020-11-16

## 2020-11-16 NOTE — TELEPHONE ENCOUNTER
Cristino called to let Dr. Abida Hammer know that he has changed his Metoprolol from tid back to bid. He stated that his BP was going down to 110/60 and now it is 120/80 with the bid. His HR was going down into the 60's with the tid and now it is back in the 80's. His CP is at a 1 or lower. He also stated that on Nov 13th, he had about 5 hours of slow afib. He took a Cardiazem and it went awhile after awhile. He just wanted to give Dr. Abida Hammer an update and does not need a call back.

## 2020-11-17 ENCOUNTER — TELEPHONE (OUTPATIENT)
Dept: CARDIOLOGY CLINIC | Age: 55
End: 2020-11-17
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
## 2020-11-30 RX ORDER — CLOPIDOGREL BISULFATE 75 MG/1
75 TABLET ORAL DAILY
Qty: 30 TABLET | Refills: 11 | Status: SHIPPED | OUTPATIENT
Start: 2020-11-30 | End: 2021-06-07 | Stop reason: ALTCHOICE

## 2020-11-30 NOTE — TELEPHONE ENCOUNTER
Theresa Lori called to ask for a refill for Plavix 75 mg taking once a day. He was prescribed while in the hospital in November. He would like it sent to [facility]    He wanted to give an update. Tasrha Lechuga He is doing great. .no cp,hr high 50's now,bp good.

## 2020-12-22 RX ORDER — METOPROLOL TARTRATE 50 MG/1
50 TABLET, FILM COATED ORAL 3 TIMES DAILY
Qty: 90 TABLET | Refills: 11 | Status: SHIPPED | OUTPATIENT
Start: 2020-12-22 | End: 2021-06-07 | Stop reason: DRUGHIGH

## 2021-01-19 ENCOUNTER — OFFICE VISIT (OUTPATIENT)
Dept: CARDIOLOGY CLINIC | Age: 56
End: 2021-01-19
Payer: COMMERCIAL

## 2021-01-19 ENCOUNTER — HOSPITAL ENCOUNTER (OUTPATIENT)
Age: 56
Discharge: HOME | End: 2021-01-19
Payer: COMMERCIAL

## 2021-01-19 VITALS
BODY MASS INDEX: 29.57 KG/M2 | WEIGHT: 212 LBS | DIASTOLIC BLOOD PRESSURE: 104 MMHG | HEART RATE: 54 BPM | SYSTOLIC BLOOD PRESSURE: 160 MMHG | OXYGEN SATURATION: 98 %

## 2021-01-19 DIAGNOSIS — I48.91 ATRIAL FIBRILLATION, NEW ONSET (HCC): ICD-10-CM

## 2021-01-19 DIAGNOSIS — R06.02 SOB (SHORTNESS OF BREATH): ICD-10-CM

## 2021-01-19 DIAGNOSIS — I48.0 PAROXYSMAL ATRIAL FIBRILLATION (HCC): ICD-10-CM

## 2021-01-19 DIAGNOSIS — Z98.61 POST PTCA: Primary | ICD-10-CM

## 2021-01-19 DIAGNOSIS — E78.5 HYPERLIPIDEMIA, UNSPECIFIED HYPERLIPIDEMIA TYPE: ICD-10-CM

## 2021-01-19 DIAGNOSIS — Z13.220 SCREENING FOR HYPERLIPIDEMIA: ICD-10-CM

## 2021-01-19 LAB
ABSOLUTE EOS #: 0.2 K/UL (ref 0–0.4)
ABSOLUTE IMMATURE GRANULOCYTE: ABNORMAL K/UL (ref 0–0.3)
ABSOLUTE LYMPH #: 2.3 K/UL (ref 1–4.8)
ABSOLUTE MONO #: 1.1 K/UL (ref 0–1)
ALBUMIN SERPL-MCNC: 4.4 G/DL (ref 3.5–5.2)
ALBUMIN/GLOBULIN RATIO: ABNORMAL (ref 1–2.5)
ALP BLD-CCNC: 84 U/L (ref 40–129)
ALT SERPL-CCNC: 34 U/L (ref 5–41)
ANION GAP SERPL CALCULATED.3IONS-SCNC: 9 MMOL/L (ref 9–17)
AST SERPL-CCNC: 26 U/L
BASOPHILS # BLD: 1 % (ref 0–2)
BASOPHILS ABSOLUTE: 0.1 K/UL (ref 0–0.2)
BILIRUB SERPL-MCNC: 0.35 MG/DL (ref 0.3–1.2)
BUN BLDV-MCNC: 18 MG/DL (ref 6–20)
BUN/CREAT BLD: 14 (ref 9–20)
CALCIUM SERPL-MCNC: 10.1 MG/DL (ref 8.6–10.4)
CHLORIDE BLD-SCNC: 103 MMOL/L (ref 98–107)
CHOLESTEROL/HDL RATIO: 4.3
CHOLESTEROL: 238 MG/DL
CO2: 27 MMOL/L (ref 20–31)
CREAT SERPL-MCNC: 1.29 MG/DL (ref 0.7–1.2)
DIFFERENTIAL TYPE: YES
EOSINOPHILS RELATIVE PERCENT: 2 % (ref 0–5)
GFR AFRICAN AMERICAN: >60 ML/MIN
GFR NON-AFRICAN AMERICAN: 58 ML/MIN
GFR SERPL CREATININE-BSD FRML MDRD: ABNORMAL ML/MIN/{1.73_M2}
GFR SERPL CREATININE-BSD FRML MDRD: ABNORMAL ML/MIN/{1.73_M2}
GLUCOSE BLD-MCNC: 98 MG/DL (ref 70–99)
HCT VFR BLD CALC: 47.4 % (ref 41–53)
HDLC SERPL-MCNC: 55 MG/DL
HEMOGLOBIN: 16 G/DL (ref 13.5–17.5)
IMMATURE GRANULOCYTES: ABNORMAL %
LDL CHOLESTEROL: 138 MG/DL (ref 0–130)
LYMPHOCYTES # BLD: 22 % (ref 13–44)
MCH RBC QN AUTO: 29.4 PG (ref 26–34)
MCHC RBC AUTO-ENTMCNC: 33.9 G/DL (ref 31–37)
MCV RBC AUTO: 86.9 FL (ref 80–100)
MONOCYTES # BLD: 11 % (ref 5–9)
NRBC AUTOMATED: ABNORMAL PER 100 WBC
PATIENT FASTING?: YES
PDW BLD-RTO: 13.9 % (ref 12.1–15.2)
PLATELET # BLD: 296 K/UL (ref 140–450)
PLATELET ESTIMATE: ABNORMAL
PMV BLD AUTO: ABNORMAL FL (ref 6–12)
POTASSIUM SERPL-SCNC: 4.8 MMOL/L (ref 3.7–5.3)
RBC # BLD: 5.45 M/UL (ref 4.5–5.9)
RBC # BLD: ABNORMAL 10*6/UL
SEG NEUTROPHILS: 64 % (ref 39–75)
SEGMENTED NEUTROPHILS ABSOLUTE COUNT: 6.7 K/UL (ref 2.1–6.5)
SODIUM BLD-SCNC: 139 MMOL/L (ref 135–144)
TOTAL PROTEIN: 7.4 G/DL (ref 6.4–8.3)
TRIGL SERPL-MCNC: 226 MG/DL
VLDLC SERPL CALC-MCNC: ABNORMAL MG/DL (ref 1–30)
WBC # BLD: 10.3 K/UL (ref 3.5–11)
WBC # BLD: ABNORMAL 10*3/UL

## 2021-01-19 PROCEDURE — 93005 ELECTROCARDIOGRAM TRACING: CPT

## 2021-01-19 PROCEDURE — 36415 COLL VENOUS BLD VENIPUNCTURE: CPT

## 2021-01-19 PROCEDURE — 80053 COMPREHEN METABOLIC PANEL: CPT

## 2021-01-19 PROCEDURE — 99214 OFFICE O/P EST MOD 30 MIN: CPT | Performed by: INTERNAL MEDICINE

## 2021-01-19 PROCEDURE — 85025 COMPLETE CBC W/AUTO DIFF WBC: CPT

## 2021-01-19 PROCEDURE — 80061 LIPID PANEL: CPT

## 2021-01-19 RX ORDER — LISINOPRIL 5 MG/1
10 TABLET ORAL 2 TIMES DAILY
Qty: 60 TABLET | Refills: 11 | Status: SHIPPED | OUTPATIENT
Start: 2021-01-19 | End: 2021-06-07 | Stop reason: DRUGHIGH
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
## 2021-01-20 LAB
EKG ATRIAL RATE: 50 BPM
EKG P AXIS: 55 DEGREES
EKG P-R INTERVAL: 242 MS
EKG Q-T INTERVAL: 490 MS
EKG QRS DURATION: 102 MS
EKG QTC CALCULATION (BAZETT): 446 MS
EKG R AXIS: 38 DEGREES
EKG T AXIS: 123 DEGREES
EKG VENTRICULAR RATE: 50 BPM

## 2021-01-20 PROCEDURE — 93010 ELECTROCARDIOGRAM REPORT: CPT | Performed by: INTERNAL MEDICINE
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
## 2021-04-21 DIAGNOSIS — I48.0 PAROXYSMAL ATRIAL FIBRILLATION (HCC): Primary | ICD-10-CM

## 2021-04-22 ENCOUNTER — TELEPHONE (OUTPATIENT)
Dept: CARDIOLOGY CLINIC | Age: 56
End: 2021-04-22

## 2021-04-22 DIAGNOSIS — I48.91 ATRIAL FIBRILLATION, NEW ONSET (HCC): Primary | ICD-10-CM

## 2021-04-22 NOTE — TELEPHONE ENCOUNTER
Patient called to update Dr Rudolph Kate as to what has been happening with him prior to his upcoming  6/7/21 appt. He has been having A-fib at least 1 time a month. Usually lasts for 8 hours then goes away. He has increase his Metoprolol to 75mg BID like discussed previously but this does not really help. Has not taken Amiodarone since Dr Rudolph Kate stopped. Still having some fatigue. Is not working nights any more which has been better. His BP has been ranging 120's / 80's and his HR is mid 50's. He has found a EP group at [facility] and was wondering if he could get a referral from  to go see them. Dr. Susanne Cross is one of the EP doctors there but will see 1st available.      The fax number to send referral is [phone]

## 2021-06-07 ENCOUNTER — OFFICE VISIT (OUTPATIENT)
Dept: CARDIOLOGY CLINIC | Age: 56
End: 2021-06-07
Payer: COMMERCIAL

## 2021-06-07 ENCOUNTER — HOSPITAL ENCOUNTER (OUTPATIENT)
Age: 56
Discharge: HOME | End: 2021-06-07
Payer: COMMERCIAL

## 2021-06-07 VITALS
WEIGHT: 208 LBS | OXYGEN SATURATION: 98 % | BODY MASS INDEX: 29.01 KG/M2 | SYSTOLIC BLOOD PRESSURE: 160 MMHG | HEART RATE: 62 BPM | DIASTOLIC BLOOD PRESSURE: 94 MMHG

## 2021-06-07 DIAGNOSIS — E55.9 VITAMIN D DEFICIENCY: ICD-10-CM

## 2021-06-07 DIAGNOSIS — I48.0 PAROXYSMAL ATRIAL FIBRILLATION (HCC): ICD-10-CM

## 2021-06-07 DIAGNOSIS — R06.02 SOB (SHORTNESS OF BREATH): ICD-10-CM

## 2021-06-07 DIAGNOSIS — R94.31 ABNORMAL EKG: ICD-10-CM

## 2021-06-07 DIAGNOSIS — Z13.220 SCREENING FOR HYPERLIPIDEMIA: ICD-10-CM

## 2021-06-07 DIAGNOSIS — I48.91 ATRIAL FIBRILLATION, NEW ONSET (HCC): Primary | ICD-10-CM

## 2021-06-07 LAB
-: ABNORMAL
ABSOLUTE EOS #: 0.3 K/UL (ref 0–0.4)
ABSOLUTE IMMATURE GRANULOCYTE: NORMAL K/UL (ref 0–0.3)
ABSOLUTE LYMPH #: 1.9 K/UL (ref 1–4.8)
ABSOLUTE MONO #: 0.8 K/UL (ref 0–1)
ALBUMIN SERPL-MCNC: 4.1 G/DL (ref 3.5–5.2)
ALBUMIN/GLOBULIN RATIO: ABNORMAL (ref 1–2.5)
ALP BLD-CCNC: 83 U/L (ref 40–129)
ALT SERPL-CCNC: 14 U/L (ref 5–41)
AMORPHOUS: ABNORMAL
ANION GAP SERPL CALCULATED.3IONS-SCNC: 11 MMOL/L (ref 9–17)
AST SERPL-CCNC: 17 U/L
BACTERIA: ABNORMAL
BASOPHILS # BLD: 0 % (ref 0–2)
BASOPHILS ABSOLUTE: 0 K/UL (ref 0–0.2)
BILIRUB SERPL-MCNC: 0.33 MG/DL (ref 0.3–1.2)
BILIRUBIN URINE: NEGATIVE
BUN BLDV-MCNC: 23 MG/DL (ref 6–20)
BUN/CREAT BLD: 12 (ref 9–20)
CALCIUM SERPL-MCNC: 9.4 MG/DL (ref 8.6–10.4)
CASTS UA: ABNORMAL /LPF
CHLORIDE BLD-SCNC: 104 MMOL/L (ref 98–107)
CHOLESTEROL/HDL RATIO: 5.9
CHOLESTEROL: 189 MG/DL
CO2: 25 MMOL/L (ref 20–31)
COLOR: YELLOW
COMMENT UA: ABNORMAL
CREAT SERPL-MCNC: 1.9 MG/DL (ref 0.7–1.2)
CRYSTALS, UA: ABNORMAL /HPF
DIFFERENTIAL TYPE: YES
EOSINOPHILS RELATIVE PERCENT: 3 % (ref 0–5)
EPITHELIAL CELLS UA: ABNORMAL /HPF
GFR AFRICAN AMERICAN: 45 ML/MIN
GFR NON-AFRICAN AMERICAN: 37 ML/MIN
GFR SERPL CREATININE-BSD FRML MDRD: ABNORMAL ML/MIN/{1.73_M2}
GFR SERPL CREATININE-BSD FRML MDRD: ABNORMAL ML/MIN/{1.73_M2}
GLUCOSE BLD-MCNC: 104 MG/DL (ref 70–99)
GLUCOSE URINE: NEGATIVE
HCT VFR BLD CALC: 42.8 % (ref 41–53)
HDLC SERPL-MCNC: 32 MG/DL
HEMOGLOBIN: 14.4 G/DL (ref 13.5–17.5)
IMMATURE GRANULOCYTES: NORMAL %
KETONES, URINE: NEGATIVE
LDL CHOLESTEROL: 123 MG/DL (ref 0–130)
LEUKOCYTE ESTERASE, URINE: NEGATIVE
LYMPHOCYTES # BLD: 21 % (ref 13–44)
MAGNESIUM: 2.2 MG/DL (ref 1.6–2.6)
MCH RBC QN AUTO: 29.5 PG (ref 26–34)
MCHC RBC AUTO-ENTMCNC: 33.7 G/DL (ref 31–37)
MCV RBC AUTO: 87.6 FL (ref 80–100)
MONOCYTES # BLD: 9 % (ref 5–9)
MUCUS: ABNORMAL
NITRITE, URINE: NEGATIVE
NRBC AUTOMATED: NORMAL PER 100 WBC
OTHER OBSERVATIONS UA: ABNORMAL
PATIENT FASTING?: YES
PDW BLD-RTO: 13.6 % (ref 12.1–15.2)
PH UA: 6 (ref 5–8)
PLATELET # BLD: 203 K/UL (ref 140–450)
PLATELET ESTIMATE: NORMAL
PMV BLD AUTO: NORMAL FL (ref 6–12)
POTASSIUM SERPL-SCNC: 5.2 MMOL/L (ref 3.7–5.3)
PROTEIN UA: NEGATIVE
RBC # BLD: 4.89 M/UL (ref 4.5–5.9)
RBC # BLD: NORMAL 10*6/UL
RBC UA: ABNORMAL /HPF (ref 0–2)
RENAL EPITHELIAL, UA: ABNORMAL /HPF
SEG NEUTROPHILS: 67 % (ref 39–75)
SEGMENTED NEUTROPHILS ABSOLUTE COUNT: 6 K/UL (ref 2.1–6.5)
SODIUM BLD-SCNC: 140 MMOL/L (ref 135–144)
SPECIFIC GRAVITY UA: 1.01 (ref 1–1.03)
TOTAL PROTEIN: 7.6 G/DL (ref 6.4–8.3)
TRICHOMONAS: ABNORMAL
TRIGL SERPL-MCNC: 172 MG/DL
TSH SERPL DL<=0.05 MIU/L-ACNC: 0.82 MIU/L (ref 0.3–5)
TURBIDITY: CLEAR
URINE HGB: ABNORMAL
UROBILINOGEN, URINE: NORMAL
VITAMIN D 25-HYDROXY: 38.8 NG/ML (ref 30–100)
VLDLC SERPL CALC-MCNC: ABNORMAL MG/DL (ref 1–30)
WBC # BLD: 9 K/UL (ref 3.5–11)
WBC # BLD: NORMAL 10*3/UL
WBC UA: ABNORMAL /HPF
YEAST: ABNORMAL

## 2021-06-07 PROCEDURE — 81003 URINALYSIS AUTO W/O SCOPE: CPT | Performed by: INTERNAL MEDICINE

## 2021-06-07 PROCEDURE — 36415 COLL VENOUS BLD VENIPUNCTURE: CPT

## 2021-06-07 PROCEDURE — 81001 URINALYSIS AUTO W/SCOPE: CPT

## 2021-06-07 PROCEDURE — 83735 ASSAY OF MAGNESIUM: CPT

## 2021-06-07 PROCEDURE — 93005 ELECTROCARDIOGRAM TRACING: CPT

## 2021-06-07 PROCEDURE — 84443 ASSAY THYROID STIM HORMONE: CPT

## 2021-06-07 PROCEDURE — 85025 COMPLETE CBC W/AUTO DIFF WBC: CPT

## 2021-06-07 PROCEDURE — 80061 LIPID PANEL: CPT

## 2021-06-07 PROCEDURE — 82306 VITAMIN D 25 HYDROXY: CPT

## 2021-06-07 PROCEDURE — 99214 OFFICE O/P EST MOD 30 MIN: CPT | Performed by: INTERNAL MEDICINE

## 2021-06-07 PROCEDURE — 80053 COMPREHEN METABOLIC PANEL: CPT

## 2021-06-07 RX ORDER — HYDRALAZINE HYDROCHLORIDE 50 MG/1
50 TABLET, FILM COATED ORAL 2 TIMES DAILY
Qty: 180 TABLET | Refills: 3 | Status: SHIPPED | OUTPATIENT
Start: 2021-06-07

## 2021-06-07 RX ORDER — HYDRALAZINE HYDROCHLORIDE 50 MG/1
50 TABLET, FILM COATED ORAL 2 TIMES DAILY
COMMUNITY
End: 2021-06-07 | Stop reason: SDUPTHER

## 2021-06-07 RX ORDER — LISINOPRIL 5 MG/1
5 TABLET ORAL 2 TIMES DAILY
COMMUNITY
Start: 2020-10-12 | End: 2021-06-07 | Stop reason: SDUPTHER

## 2021-06-07 RX ORDER — METOPROLOL TARTRATE 50 MG/1
75 TABLET, FILM COATED ORAL 2 TIMES DAILY
COMMUNITY
End: 2021-06-14 | Stop reason: SDUPTHER

## 2021-06-07 RX ORDER — LISINOPRIL 5 MG/1
5 TABLET ORAL 2 TIMES DAILY
COMMUNITY
End: 2021-06-07 | Stop reason: ALTCHOICE

## 2021-06-07 NOTE — PATIENT INSTRUCTIONS
Will get U/A today     Will set up for Echo to be done at [address] Lisinopril and switch to   Hydralazine 50 mg one tablet twice a day     Get Chem 7 in 2 week since increase of lisinopril     Follow up in 3 months with cbc/cmp/tsh

## 2021-06-07 NOTE — PROGRESS NOTES
Ov Dr. Nora Donnelly for follow up   Has not had Eliquis x 2 weeks   D/t cost. He will be checking out prices   Has had one episode of a fib   Has been adjusting lisinopril/lopressor   Has been traveling as a traveling nurse   Back in [facility] now (homebase)  Doing his rehab at home himself   C/o fatigue   Bedside echo done      Will get U/A today     Will set up for Echo to be done at [facility]     Will STOP Lisinopril and switch to   Hydralazine 50 mg one tablet twice a day     Get Chem 7 in 2 week since increase of lisinopril     Follow up in 3 months with cbc/cmp/tsh

## 2021-06-08 DIAGNOSIS — R06.02 SOB (SHORTNESS OF BREATH): ICD-10-CM

## 2021-06-08 DIAGNOSIS — I48.0 PAROXYSMAL ATRIAL FIBRILLATION (HCC): ICD-10-CM

## 2021-06-08 DIAGNOSIS — I48.91 ATRIAL FIBRILLATION, NEW ONSET (HCC): ICD-10-CM

## 2021-06-08 LAB
EKG ATRIAL RATE: 51 BPM
EKG P AXIS: 49 DEGREES
EKG P-R INTERVAL: 220 MS
EKG Q-T INTERVAL: 484 MS
EKG QRS DURATION: 100 MS
EKG QTC CALCULATION (BAZETT): 446 MS
EKG R AXIS: 21 DEGREES
EKG T AXIS: 100 DEGREES
EKG VENTRICULAR RATE: 51 BPM

## 2021-06-08 PROCEDURE — 93010 ELECTROCARDIOGRAM REPORT: CPT | Performed by: INTERNAL MEDICINE

## 2021-06-10 ENCOUNTER — TELEPHONE (OUTPATIENT)
Dept: CARDIOLOGY CLINIC | Age: 56
End: 2021-06-10

## 2021-06-10 DIAGNOSIS — E78.5 HYPERLIPIDEMIA, UNSPECIFIED HYPERLIPIDEMIA TYPE: ICD-10-CM

## 2021-06-10 DIAGNOSIS — I48.91 ATRIAL FIBRILLATION, NEW ONSET (HCC): Primary | ICD-10-CM

## 2021-06-10 RX ORDER — ROSUVASTATIN CALCIUM 10 MG/1
10 TABLET, COATED ORAL DAILY
Qty: 30 TABLET | Refills: 11 | Status: SHIPPED | OUTPATIENT
Start: 2021-06-10

## 2021-06-10 NOTE — TELEPHONE ENCOUNTER
Pt notified of lipids. Will to try a statin now.   Will add crestor 10 mg qd   Per Dr. Eugene Gloria
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
## 2021-06-14 RX ORDER — METOPROLOL TARTRATE 50 MG/1
75 TABLET, FILM COATED ORAL 2 TIMES DAILY
Qty: 270 TABLET | Refills: 3 | Status: SHIPPED | OUTPATIENT
Start: 2021-06-14 | End: 2022-07-05

## 2021-09-16 ENCOUNTER — OFFICE VISIT (OUTPATIENT)
Dept: CARDIOLOGY CLINIC | Age: 56
End: 2021-09-16
Payer: COMMERCIAL

## 2021-09-16 DIAGNOSIS — I48.91 ATRIAL FIBRILLATION, NEW ONSET (HCC): Primary | ICD-10-CM

## 2021-09-16 DIAGNOSIS — E78.5 HYPERLIPIDEMIA, UNSPECIFIED HYPERLIPIDEMIA TYPE: ICD-10-CM

## 2021-09-16 DIAGNOSIS — R06.02 SOB (SHORTNESS OF BREATH): ICD-10-CM

## 2021-09-16 PROCEDURE — 99212 OFFICE O/P EST SF 10 MIN: CPT | Performed by: INTERNAL MEDICINE

## 2021-09-16 NOTE — PROGRESS NOTES
Dr. India Gupta met with pt privately - no vitals done per Dr. India Gupta   Will set up for echo and routine testing in 6 months   To be done at [facility]

## 2021-09-20 NOTE — PROGRESS NOTES
Cardiology    Raissa Hansen is doing well, exercising including biking on a regular basis. Ablation was not done because of insurance purposes. He does not want to get any blood work at this time, or do echo. He is not taking medication. We will meet in 6 months with full set of blood work and echo to look at LV function.     Alyse Parker MD

## 2022-07-05 RX ORDER — METOPROLOL TARTRATE 50 MG/1
TABLET, FILM COATED ORAL
Qty: 270 TABLET | Refills: 0 | Status: SHIPPED | OUTPATIENT
Start: 2022-07-05

## 2022-09-26 RX ORDER — METOPROLOL TARTRATE 50 MG/1
TABLET, FILM COATED ORAL
Qty: 270 TABLET | Refills: 0 | OUTPATIENT
Start: 2022-09-26